# Patient Record
(demographics unavailable — no encounter records)

---

## 2025-01-10 NOTE — CONSULT LETTER
[Dear  ___] : Dear  [unfilled], [FreeTextEntry1] : I had the pleasure of evaluating your patient, KADEN GONZALEZ , in the office today.  Please review my consultation and evaluation report that follows below.  Please do not hesitate to call me if further information is necessary or if you wish to discuss ongoing care or diagnostic work-up.    I very much appreciate your referral and it is a privilege to be able to provide care for your patient.  Sincerely,   Sukhwinder Martinez MD, MHCM, FACP, RENE-C Pulmonary Medicine  of Medicine Yohan kelly Barragan [facility] at hospitals/[facility] [email or web address] in [address] [place]  [address]    Fax   [facility] at [address]  [phone] [DrHillary  ___] : Dr. STRAUSS

## 2025-01-10 NOTE — ASSESSMENT
[FreeTextEntry1] : 67 yo woman referred for evaluation of COPD Smoker for 50 years, now smoking 1/2 ppd still Has had bronchitis , has been treated for COPD with Trelegy for about a year Has been evaluated by Dr Blanchard and [facility] for polycythemia that they suggested was due to pulmonary disease, apparently no hematologic problem--records not available Hx CAD, s/p stenting in 2012 went to [facility] for jaw pain in June 2024 CTA showed nonobstructive disease but enlarged pulmonary artery--only report available Sent for CT chest by Dr Blanchard: June 2024: Stable small pulmonary nodules back to 2022 and 2023 and mosaic pattern noted (films from [place] [facility] and report only) Then has seen cardiology Dr Schuler [facility] Cardio Meds Metoprolol 50, asa, atrorvastatin, lisinopril and now Ozempic. Interim: PAtient taking trelegy--feeling about the same Note CT finding of mosaic findings suggestive of COPD Sleep study performed Mild IGNACIA with AHI 11 HOWEVER< signifcant hypoxemia noted with 2 hours below sat 90%  PFTs today: [place] shows good F-V loop with FEV1% 70% , but midexpir flow 57% No restriction, DLCO was 81% predicted This is consistent with airways disease  Imp 67 yo woman with CAD, s/p stenting  CTA showed enlarged pulmonary artery Hx of polycythemia requiring phlebotomies  History of smoking, CT chest suggested mosaic pattern consistent with airways obstruction PFTs today show obstruction but not severe and likely no emphysema Recent ECHO at [facility] does not address PA pressures but R and RV described as normal  Sleep study suggests prolong hypoxemia in spite of mild IGNACIA However, this is likely cause of her polycythemia  Recommend begin Auto pap WITH 2 LPM nasal oxygen and early appointment for sleep followup with Dr Salomon Continue Trelelgy for COPD, albuterol MDI, nebs Needs eventual repeat CT  RTC three months

## 2025-01-10 NOTE — HISTORY OF PRESENT ILLNESS
[TextBox_4] : 69 yo woman referred for evaluation of COPD Smoker for 50 years, now smoking 1/2 ppd still Has had bronchitis , has been treated for COPD with Trelegy for about a year Has been evaluated by Dr Blanchard and [facility] for polycythemia that they suggested was due to pulmonary disease, apparently no hematologic problem--records not available Hx CAD, s/p stenting in 2012 went to [facility] for jaw pain in June 2024 CTA showed nonobstructive disease but enlarged pulmonary artery--only report available Sent for CT chest by Dr Blanchard: June 2024: Stable small pulmonary nodules back to 2022 and 2023 and mosaic pattern noted (films from [place] [facility] and report only) Then has seen cardiology Dr Schuler [facility] Cardio Meds Metoprolol 50, asa, atrorvastatin, lisinopril and now Ozempic.  Interim: PAtient taking trelegy--feeling about the same Note CT finding of mosaic findings suggestive of COPD Sleep study performed Mild IGNACIA with AHI 11 HOWEVER< signifcant hypoxemia noted with 2 hours below sat 90%  PFTs today: [place] shows good F-V loop with FEV1% 70% , but midexpir flow 57% No restriction, DLCO was 81% predicted This is consistent with airways disease

## 2025-01-30 NOTE — ASSESSMENT
[FreeTextEntry1] : Mild IGNACIA with EDS, hypoxia. Should be treated. Hypoxia contributed to as well from smoking, COPD.

## 2025-01-30 NOTE — HISTORY OF PRESENT ILLNESS
[Date: ___] : Date of most recent diagnostic polysomnogram: [unfilled] [AHI: ___ per hour] : Apnea-hypopnea index:  [unfilled] per hour [T90%: ___] : T90%: [unfilled]% [Daytime Somnolence] : daytime somnolence [FreeTextEntry1] : Has EDS with ESS 9, snoring, polycythemia.   Went for HST 11/19/24 showing mild IGNACIA but significant hypoxia.   APAP 4-16 with supplemental O2 at 2 LPM ordered. She is getting it 2/8/25.  Sees Dr Martinez for COPD.  Still smoking.   BMI 33. [ESS] : 9

## 2025-01-30 NOTE — PLAN
[TextEntry] : Start CPAP with O2 as soon as she gets it.  Reviewed use of CPAP. Different mask choices. Desensitization. Compliance goals. Once she is using it and comfortable, will do an overnight oxymetry test at home WITH cpap and WITHOUT O2. I recc in lab titration instead but she does not want it. Smoking cessation. F/U with Dr Martinez.  Smoking cessation counseling done with the patient. Reviewed adverse effects of smoking on health. Reviewed pharmaceutical treatment options including NRT, Zyban, Chantix. Referral to smoking cessation class at [facility]. 6 minutes spent in smoking cessation counseling.

## 2025-02-03 NOTE — PHYSICAL EXAM
[FreeTextEntry1] :  GENERAL APPEARANCE: Well developed, well-nourished woman in no acute distress.  NEUROLOGIC EXAM:  MENTAL STATUS: Alert and Oriented to person, place and time. Speech is fluent, without aphasia or dysarthria Behavior and affect appropriate to situation.                         CRANIAL NERVES: CN 2:    Visual fields are full to confrontation. CN 3, 4, 6: Extraocular movements are intact. No nystagmus or ophthalmoplegia is evident. Pupils are equally round and reactive to light. CN 5:     Facial sensation is intact to light touch in all 3 divisions CN 7:     Facial excursion is full and symmetric bilaterally.  MOTOR: Strength is 5/5 throughout for age and stature. No orbiting or drift noted.  SENSORY: Intact to light touch perception in all four extremities without extinction to double simultaneous stimulation  COORD: Finger to nose testing without dysmetria bilaterally.  GAIT: Normal station and gait. Able to tandem. Romberg negative

## 2025-02-03 NOTE — DISCUSSION/SUMMARY
[FreeTextEntry1] : Ms. 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   I have personally reviewed available neuroradiological images from [place] [facility].  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  We discussed with the patient about the natural history of cerebral aneurysms and the chance of an aneurysm causing a SAH. We also discussed endovascular, open surgical and conservative management strategies.   CTA images are suboptimal. Given the size and multilobulated shape of her cerebral aneurysm, I recommend diagnostic cerebral angiogram to evaluate further the aneurysm and to aid in decision with proceeding with endovascular treatment or not.  The goals, benefits, alternatives, and risks of the procedure were discussed with the patient. Risks including, but not limited to, stroke, dissection, groin hematoma, and contrast reaction were discussed with the patient. The patient is in agreement to proceed with the procedure.  Importance and benefits of smoking cessation and risks of continued tobacco use were discussed in detail at this visit with the patient. I discussed that continued tobacco use puts her at increased risk of aneurysmal rupture. The patient was strongly encouraged to stop smoking.   PLAN: - Plan for diagnostic cerebral angiogram - February 6, 2025 - PST prior to procedure - Normotensive - Discussed the importance of smoking cessation  Follow up after the procedure. All of the patient's questions and concerns were addressed.

## 2025-02-03 NOTE — HISTORY OF PRESENT ILLNESS
[FreeTextEntry1] : Ms. Mireles is a 68-year-old female with history of aortic aneurysm, MI (2012) s/p cardiac stent, HLD, HTN, prediabetes who presents today for initial evaluation of incidentally found cerebral aneurysm. She reports experiencing dizziness around the end of 2023, sent for vessel imaging by her PCP. I have personally reviewed available neuroradiological images from [place] [facility]. 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 She saw Dr. Mahan at [facility] shortly after who recommended diagnostic cerebral angiogram; however, the patient's insurance no longer covered this provider, so she never returned. She is here to reestablish care. Denies headaches, vision changes. Current smoker (1/2PPD). No alcohol use, illicit drug use. No known family history of cerebral aneurysm.

## 2025-02-03 NOTE — DISCUSSION/SUMMARY
[FreeTextEntry1] : Ms. 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   I have personally reviewed available neuroradiological images from [place] [facility].  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  We discussed with the patient about the natural history of cerebral aneurysms and the chance of an aneurysm causing a SAH. We also discussed endovascular, open surgical and conservative management strategies.   CTA images are suboptimal. Given the size and multilobulated shape of her cerebral aneurysm, I recommend diagnostic cerebral angiogram to evaluate further the aneurysm and to aid in decision with proceeding with endovascular treatment or not.  The goals, benefits, alternatives, and risks of the procedure were discussed with the patient. Risks including, but not limited to, stroke, dissection, groin hematoma, and contrast reaction were discussed with the patient. The patient is in agreement to proceed with the procedure.  Importance and benefits of smoking cessation and risks of continued tobacco use were discussed in detail at this visit with the patient. I discussed that continued tobacco use puts her at increased risk of aneurysmal rupture. The patient was strongly encouraged to stop smoking.   PLAN: - Plan for diagnostic cerebral angiogram - February 6, 2025 - PST prior to procedure - Normotensive - Discussed the importance of smoking cessation  Follow up after the procedure. All of the patient's questions and concerns were addressed.

## 2025-02-03 NOTE — PHYSICAL EXAM
[FreeTextEntry1] :  GENERAL APPEARANCE: Well developed, well-nourished woman in no acute distress.  NEUROLOGIC EXAM:  MENTAL STATUS: Alert and Oriented to person, place and time. Speech is fluent, without aphasia or dysarthria Behavior and affect appropriate to situation.                         CRANIAL NERVES: CN 2:    Visual fields are full to confrontation. CN 3, 4, 6: Extraocular movements are intact. No nystagmus or ophthalmoplegia is evident. Pupils are equally round and reactive to light. CN 5:     Facial sensation is intact to light touch in all 3 divisions CN 7:     Facial excursion is full and symmetric bilaterally.  MOTOR: Strength is 5/5 throughout for age and stature. No orbiting or drift noted.  SENSORY: Intact to light touch perception in all four extremities without extinction to double simultaneous stimulation  COORD: Finger to nose testing without dysmetria bilaterally.  GAIT: Normal station and gait. Able to tandem. Romberg [facility]

## 2025-02-03 NOTE — HISTORY OF PRESENT ILLNESS
[FreeTextEntry1] : Ms. Mireles is a 68-year-old female with history of aortic aneurysm, MI (2012) s/p cardiac stent, HLD, HTN, prediabetes who presents today for initial evaluation of incidentally found cerebral aneurysm. She reports experiencing dizziness around the end of 2023, sent for vessel imaging by her PCP. I have personally reviewed available neuroradiological images from [place] [facility]. 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 She saw Dr. Mahan at [facility] shortly after who recommended diagnostic cerebral angiogram; however, the patient's insurance no longer covered this provider, so she never returned. She is here to reestablish care. Denies headaches, vision changes. Current smoker (1/2PPD). No alcohol use, illicit drug use. No known family history of cerebral aneurysm.